# Patient Record
Sex: FEMALE | Race: WHITE | NOT HISPANIC OR LATINO | URBAN - METROPOLITAN AREA
[De-identification: names, ages, dates, MRNs, and addresses within clinical notes are randomized per-mention and may not be internally consistent; named-entity substitution may affect disease eponyms.]

---

## 2024-09-16 ENCOUNTER — OFFICE VISIT (OUTPATIENT)
Dept: URGENT CARE | Age: 69
End: 2024-09-16
Payer: COMMERCIAL

## 2024-09-16 VITALS
TEMPERATURE: 96.6 F | DIASTOLIC BLOOD PRESSURE: 95 MMHG | OXYGEN SATURATION: 95 % | BODY MASS INDEX: 31.47 KG/M2 | RESPIRATION RATE: 16 BRPM | SYSTOLIC BLOOD PRESSURE: 179 MMHG | HEART RATE: 83 BPM | WEIGHT: 195 LBS

## 2024-09-16 DIAGNOSIS — R09.81 NASAL CONGESTION: ICD-10-CM

## 2024-09-16 DIAGNOSIS — J02.9 SORE THROAT: ICD-10-CM

## 2024-09-16 DIAGNOSIS — H10.33 ACUTE CONJUNCTIVITIS OF BOTH EYES, UNSPECIFIED ACUTE CONJUNCTIVITIS TYPE: Primary | ICD-10-CM

## 2024-09-16 LAB — POC RAPID STREP: NEGATIVE

## 2024-09-16 RX ORDER — TOBRAMYCIN 3 MG/ML
1 SOLUTION/ DROPS OPHTHALMIC 4 TIMES DAILY
Qty: 5 ML | Refills: 0 | Status: SHIPPED | OUTPATIENT
Start: 2024-09-16 | End: 2024-09-23

## 2024-09-16 RX ORDER — LEVOTHYROXINE SODIUM 125 UG/1
125 TABLET ORAL DAILY
COMMUNITY

## 2024-09-16 RX ORDER — LISINOPRIL AND HYDROCHLOROTHIAZIDE 12.5; 2 MG/1; MG/1
1 TABLET ORAL DAILY
COMMUNITY

## 2024-09-16 NOTE — PROGRESS NOTES
Subjective   Patient ID: Maine Charles is a 68 y.o. female. They present today with a chief complaint of Sore Throat and Eye Problem.    HISTORY OF PRESENT ILLNESS:    Pt presents to the clinic for sore throat, upper respiratory congestion, and b/l eye redness, burning/irritation, and drainage. She reports upper respiratory symptoms began about 6 days ago and have improved. Conjunctivitis symptoms started in the right eye several days ago and have since traveled to the left eye as well. Describes drainage as yellow and worse after sleep. There is slight tenderness over the right upper eyelid. Denies contact lens use. Denies known trauma or injury involving the eyes.    Past Medical History  Allergies as of 09/16/2024 - Reviewed 09/16/2024   Allergen Reaction Noted    Ofloxacin Hives 09/18/2015    Penicillins Hives 09/18/2015    Sulfa (sulfonamide antibiotics) Hives 09/18/2015       Current Outpatient Medications   Medication Instructions    levothyroxine (SYNTHROID, LEVOXYL) 125 mcg, oral, Daily    lisinopriL-hydrochlorothiazide 20-12.5 mg tablet 1 tablet, oral, Daily    tobramycin (Tobrex) 0.3 % ophthalmic solution 1 drop, Both Eyes, 4 times daily         No past medical history on file.    No past surgical history on file.         Review of Systems    Review of Systems   Constitutional:  Negative for chills and fever.   HENT:  Positive for congestion and sore throat. Negative for ear pain and sinus pain.    Eyes:  Positive for pain (burning), discharge and redness. Negative for visual disturbance.   Respiratory:  Negative for chest tightness, shortness of breath and wheezing.    Cardiovascular:  Negative for chest pain.   Gastrointestinal:  Negative for nausea and vomiting.   Skin:  Negative for rash.   All other systems reviewed and are negative.                              Objective    Vitals:    09/16/24 1729   BP: (!) 179/95   Pulse: 83   Resp: 16   Temp: 35.9 °C (96.6 °F)   SpO2: 95%   Weight: 88.5 kg (195  lb)     No LMP recorded.  PHYSICAL EXAMINATION:    Physical Exam  Vitals and nursing note reviewed.   Constitutional:       General: She is not in acute distress.     Appearance: Normal appearance. She is not ill-appearing.   HENT:      Head: Normocephalic and atraumatic.      Right Ear: There is impacted cerumen.      Left Ear: Tympanic membrane, ear canal and external ear normal. There is no impacted cerumen.      Nose: Nose normal.      Mouth/Throat:      Mouth: Mucous membranes are moist.      Pharynx: Oropharynx is clear. No oropharyngeal exudate or posterior oropharyngeal erythema.   Eyes:      General: Vision grossly intact.         Right eye: Discharge present. No hordeolum.         Left eye: Discharge (yellowish, concentrated at medial canthus) present.No hordeolum.      Extraocular Movements: Extraocular movements intact.      Conjunctiva/sclera:      Right eye: Right conjunctiva is injected.      Left eye: Left conjunctiva is injected.      Pupils: Pupils are equal, round, and reactive to light.      Comments: + mild infra-orbital edema bilaterally, but no tenderness, erythema, or warmth to touch.   Cardiovascular:      Rate and Rhythm: Normal rate and regular rhythm.      Heart sounds: No murmur heard.     Comments: (+) hypertensive.  Pulmonary:      Effort: Pulmonary effort is normal. No respiratory distress.      Breath sounds: Normal breath sounds. No stridor. No wheezing, rhonchi or rales.   Musculoskeletal:      Cervical back: Normal range of motion and neck supple.   Lymphadenopathy:      Cervical: No cervical adenopathy.   Skin:     General: Skin is warm and dry.   Neurological:      General: No focal deficit present.      Mental Status: She is alert and oriented to person, place, and time.   Psychiatric:         Mood and Affect: Mood normal.         Behavior: Behavior normal.        Procedures    Results for orders placed or performed in visit on 09/16/24   POCT rapid strep A manually resulted    Result Value Ref Range    POC Rapid Strep Negative Negative     Diagnostic study results (if any) were reviewed by Ashley Godwin PA-C.    Assessment/Plan   Allergies, medications, history, and pertinent labs/EKGs/Imaging reviewed by Ashley Godwin PA-C.     Orders and Diagnoses  Diagnoses and all orders for this visit:  Acute conjunctivitis of both eyes, unspecified acute conjunctivitis type  -     tobramycin (Tobrex) 0.3 % ophthalmic solution; Administer 1 drop into both eyes 4 times a day for 7 days.  Sore throat  -     POCT rapid strep A manually resulted  Nasal congestion      Medical Admin Record    Given overall well appearance, vital signs, history, and exam as above, there is no indication for further emergent testing/intervention at this time.      I discussed with the patient my clinical thoughts at this time given the above and we had a shared decision-making conversation in a patient-centered decision-making model on how to proceed forward. Pt was instructed on the importance of close follow-up. They were told that an urgent care diagnosis is often a preliminary impression and that definitive care is often not able to be given in the urgent care setting. Pt was educated that close follow-up is essential for good health and good outcomes. Patient was provided with the following instructions:         Use antibiotic eyedrops as directed.       May take Zyrtec or Claritin and use Flonase for symptomatic relief daily.     Air purifier in room at night while sleeping.      Tylenol for fevers/pain as needed.      Plenty of fluids and rest.      Good hand hygiene.      Follow up with your PCP in the next 5-7 days if sx fail to show adequate improvement.         Clinical impression as well as limitations of available testing/examination, all discussed with patient. Pt is well at this time in the urgent care. They are comfortable with the present assessment and plan to be discharged home. Discussed with them  close outpatient follow up, reassessment, and possible further testing/treatment via their PCP/specialist if symptoms fail to improve; they agree, understand, and are comfortable with this plan. Pt given the opportunity to ask questions prior to discharge and all questions were answered at this time. Via our discussion, the patient was advised of warning signs and instructions were reviewed. Strict ED precautions were given, acknowledged, and understood. Discussed with the patient/family that it is okay to return to the urgent care at any time for anything. Advised to present to the ED if present condition changes/worsens or if they develop new symptoms at any time after discharge. Also, advised to go to the ED if they cannot establish outpatient follow-up in time frame specified above. Pt verbalized understanding and agreement with plan and instructions. Discussed the need for close follow up with their primary care provider and/or specialist for further testing/treatment/care/consultation in the short time frame as noted above, if needed - they understand these instructions and agree to close follow up for these reasons. Patient discharged home in stable condition.    Follow Up Instructions  No follow-ups on file.    Patient disposition: Home    Electronically signed by Ashley Godwin PA-C  5:24 PM

## 2024-09-17 ASSESSMENT — ENCOUNTER SYMPTOMS
SINUS PAIN: 0
SHORTNESS OF BREATH: 0
EYE REDNESS: 1
CHILLS: 0
NAUSEA: 0
FEVER: 0
CHEST TIGHTNESS: 0
EYE DISCHARGE: 1
SORE THROAT: 1
WHEEZING: 0
VOMITING: 0
EYE PAIN: 1

## 2024-09-17 ASSESSMENT — VISUAL ACUITY: OU: 1
